# Patient Record
(demographics unavailable — no encounter records)

---

## 2025-01-27 NOTE — DISCUSSION/SUMMARY
[de-identified] : The patient and their family member(s) were advised of the diagnosis. The natural history of the pathology was explained in full to the patient and the family in layman's terms.  right pain- refusal to weight bear Unclear if any injury just became warm to touch in the last hour Here is the plan that we have set forth today. 1. close close monitoring -went through a variety of circumstances with family- viral vs osteomyleitis. 2. recommend they monitor for temps today 3. recommend they give him some motrin every 6 hours. tonight and tomorrow morning.  I will call them in sherri morning. If he isn't perking up while the Motrin is working, if he still seems warm and not willing to WB then I will recommend they get the labs- CBC, esr, CRP and possibly head to [facility] children's ED. 4. mom and dad are well versed in the possible splay of issues. The patient and the family understands the plan of care as described above.  All questions have been answered. Thank you for allowing me to care for FRANKLYN. Sincerely, Micaela Garcia, DO, FAAP, CAQ[facility]

## 2025-01-27 NOTE — HISTORY OF PRESENT ILLNESS
[de-identified] : 1/27/25 Eze Alonzo is a 6 year old male who presents today complaining of right knee pain. Date of Injury/Onset: 1/25/24  Pain at Rest: 2 Pain with Activity: 6  Mechanism of injury: States he was playing with his cousins and he maybe banged knee on [employer] bed ladder- seemed fine that day- yesterday was sitting playing and then when he went to get up he had pain and refused to WB slept fine last night- no issues- no waking today not willing to WB again. See at - had knee x-rays- negative per report- no images available today just in the last hour he appears to be a bit mushy, warm to touch, sniffles and poor appetite today Quality of symptoms: NWB  Improves with: ice   Prior treatment/ Imaging: Ice and XR from [facility] in [place]. No medication to date vaccinated otherwise healthy and well   Out of work: School/Sport/Position/Occupation: Additional Information: None   Review of Systems: Constitutional: negative HEENT: negative CV: negative Pulm: negative GI: negative : negative Neuro: negative Skin: negative Endocrine: negative Heme: negative MSK: See HPI.

## 2025-01-27 NOTE — IMAGING
[de-identified] : Constitutional: Healthy, and well nourished in no acute distress but somewhat tired- laying on bed. however conversational. tearful at one point but otherwise ok Psych: Calm, cooperative, grossly normal Eyes: Normal, sclera non-icteric Ears, Nose, Mouth, Throat: External inspection of nose and ears does not reveal any scars or masses Head: Normocephalic Neck: Neck appears supple without sign of limited or painful ROM Respiratory: Normal effort, no respiratory distress, no cyanosis Cardiovascular: Visualized extremities without edema or varicosities, warm, brisk cap refill Abdominal/GI: Not examined Skin: No rashes on the extremity examined.  Neurological: Patient is awake and alert   FULL ROM of the hips no issues or guarding. FROM of the left knee patient preferes to keep the right knee bent at about 90 deg of flexion but passively i can straighten to neutral. NO christian effusion appreciated. acl intact, varus and valgus testing is unremarkable mcmurrays no issue. He has complete flexion compared to contralateral side. no rash no redness or skin discoloration. no christian areas of tenderness Tried to get him to stand- refused.

## 2025-01-27 NOTE — DISCUSSION/SUMMARY
[de-identified] : The patient and their family member(s) were advised of the diagnosis. The natural history of the pathology was explained in full to the patient and the family in layman's terms.  right pain- refusal to weight bear Unclear if any injury just became warm to touch in the last hour Here is the plan that we have set forth today. 1. close close monitoring -went through a variety of circumstances with family- viral vs osteomyleitis. 2. recommend they monitor for temps today 3. recommend they give him some motrin every 6 hours. tonight and tomorrow morning.  I will call them in sherri morning. If he isn't perking up while the Motrin is working, if he still seems warm and not willing to WB then I will recommend they get the labs- CBC, esr, CRP and possibly head to [facility] children's ED. 4. mom and dad are well versed in the possible splay of issues. The patient and the family understands the plan of care as described above.  All questions have been answered. Thank you for allowing me to care for FRANKLYN. Sincerely, Micaela Garcia, DO, FAAP, CA[facility]

## 2025-01-27 NOTE — HISTORY OF PRESENT ILLNESS
[de-identified] : 1/27/25 Eze Alonzo is a 6 year old male who presents today complaining of right knee pain. Date of Injury/Onset: 1/25/24  Pain at Rest: 2 Pain with Activity: 6  Mechanism of injury: States he was playing with his cousins and he maybe banged knee on [employer] bed ladder- seemed fine that day- yesterday was sitting playing and then when he went to get up he had pain and refused to WB slept fine last night- no issues- no waking today not willing to WB again. See at - had knee x-rays- negative per report- no images available today just in the last hour he appears to be a bit mushy, warm to touch, sniffles and poor appetite today Quality of symptoms: NWB  Improves with: ice   Prior treatment/ Imaging: Ice and XR from [facility] in [place]. No medication to date vaccinated otherwise healthy and well   Out of work: School/Sport/Position/Occupation: Additional Information: None   Review of Systems: Constitutional: negative HEENT: negative CV: negative Pulm: negative GI: negative : negative Neuro: negative Skin: negative Endocrine: negative Heme: negative MSK: See HPI.

## 2025-01-27 NOTE — IMAGING
[de-identified] : Constitutional: Healthy, and well nourished in no acute distress but somewhat tired- laying on bed. however conversational. tearful at one point but otherwise ok Psych: Calm, cooperative, grossly normal Eyes: Normal, sclera non-icteric Ears, Nose, Mouth, Throat: External inspection of nose and ears does not reveal any scars or masses Head: Normocephalic Neck: Neck appears supple without sign of limited or painful ROM Respiratory: Normal effort, no respiratory distress, no cyanosis Cardiovascular: Visualized extremities without edema or varicosities, warm, brisk cap refill Abdominal/GI: Not examined Skin: No rashes on the extremity examined.  Neurological: Patient is awake and alert   FULL ROM of the hips no issues or guarding. FROM of the left knee patient preferes to keep the right knee bent at about 90 deg of flexion but passively i can straighten to neutral. NO christian effusion appreciated. acl intact, varus and valgus testing is unremarkable mcmurrays no issue. He has complete flexion compared to contralateral side. no rash no redness or skin discoloration. no christian areas of tenderness Tried to get him to stand- refused.